# Patient Record
Sex: MALE | Race: WHITE | Employment: OTHER | URBAN - METROPOLITAN AREA
[De-identification: names, ages, dates, MRNs, and addresses within clinical notes are randomized per-mention and may not be internally consistent; named-entity substitution may affect disease eponyms.]

---

## 2017-04-19 PROBLEM — L97.921 NON-PRESSURE CHRONIC ULCER LEFT LOWER LEG, LIMITED TO BREAKDOWN SKIN (HCC): Chronic | Status: ACTIVE | Noted: 2017-04-19

## 2017-04-19 PROBLEM — L97.912 NON-PRESSURE ULCER OF RIGHT LOWER EXTREMITY WITH FAT LAYER EXPOSED (HCC): Chronic | Status: ACTIVE | Noted: 2017-04-19

## 2017-04-19 PROBLEM — L97.929 VENOUS STASIS ULCER OF LEFT LOWER EXTREMITY (HCC): Chronic | Status: ACTIVE | Noted: 2017-04-19

## 2017-04-19 PROBLEM — I83.019 VENOUS STASIS ULCER OF RIGHT LOWER EXTREMITY (HCC): Chronic | Status: ACTIVE | Noted: 2017-04-19

## 2017-04-19 PROBLEM — L97.919 VENOUS STASIS ULCER OF RIGHT LOWER EXTREMITY (HCC): Chronic | Status: ACTIVE | Noted: 2017-04-19

## 2017-04-19 PROBLEM — I83.029 VENOUS STASIS ULCER OF LEFT LOWER EXTREMITY (HCC): Chronic | Status: ACTIVE | Noted: 2017-04-19

## 2017-05-10 PROBLEM — T82.898A FEMORAL-POPLITEAL BYPASS GRAFT OCCLUSION, LEFT (HCC): Chronic | Status: ACTIVE | Noted: 2017-05-10

## 2017-05-10 PROBLEM — T82.898A: Chronic | Status: ACTIVE | Noted: 2017-05-10

## 2017-05-10 PROBLEM — I70.25 ATHEROSCLEROSIS OF NATIVE ARTERY OF LOWER EXTREMITY WITH ULCERATION OF CALF (HCC): Chronic | Status: ACTIVE | Noted: 2017-05-10

## 2018-07-26 ENCOUNTER — HOSPITAL ENCOUNTER (OUTPATIENT)
Dept: NON INVASIVE DIAGNOSTICS | Age: 78
Discharge: HOME | End: 2018-07-26
Payer: MEDICARE

## 2018-07-26 ENCOUNTER — HOSPITAL ENCOUNTER (OUTPATIENT)
Age: 78
Discharge: HOME | End: 2018-07-26
Payer: MEDICARE

## 2018-07-26 LAB
ALBUMIN SERPL-MCNC: 3.6 G/DL (ref 3.5–5.2)
ALP BLD-CCNC: 83 U/L (ref 40–129)
ALT SERPL-CCNC: 33 U/L (ref 0–40)
ANION GAP SERPL CALCULATED.3IONS-SCNC: 13 MMOL/L (ref 7–16)
AST SERPL-CCNC: 26 U/L (ref 0–39)
BILIRUB SERPL-MCNC: 1.2 MG/DL (ref 0–1.2)
BUN BLDV-MCNC: 13 MG/DL (ref 8–23)
CALCIUM SERPL-MCNC: 8.8 MG/DL (ref 8.6–10.2)
CHLORIDE BLD-SCNC: 104 MMOL/L (ref 98–107)
CO2: 24 MMOL/L (ref 22–29)
CREAT SERPL-MCNC: 0.8 MG/DL (ref 0.7–1.2)
EKG ATRIAL RATE: 80 BPM
EKG P AXIS: 78 DEGREES
EKG P-R INTERVAL: 178 MS
EKG Q-T INTERVAL: 460 MS
EKG QRS DURATION: 164 MS
EKG QTC CALCULATION (BAZETT): 530 MS
EKG R AXIS: -7 DEGREES
EKG T AXIS: 113 DEGREES
EKG VENTRICULAR RATE: 80 BPM
GFR AFRICAN AMERICAN: >60
GFR NON-AFRICAN AMERICAN: >60 ML/MIN/1.73
GLUCOSE BLD-MCNC: 153 MG/DL (ref 74–109)
MAGNESIUM: 1.7 MG/DL (ref 1.6–2.6)
POTASSIUM SERPL-SCNC: 4.3 MMOL/L (ref 3.5–5)
SODIUM BLD-SCNC: 141 MMOL/L (ref 132–146)
TOTAL PROTEIN: 7.1 G/DL (ref 6.4–8.3)

## 2018-07-26 PROCEDURE — 93005 ELECTROCARDIOGRAM TRACING: CPT | Performed by: INTERNAL MEDICINE

## 2018-07-26 PROCEDURE — 80053 COMPREHEN METABOLIC PANEL: CPT

## 2018-07-26 PROCEDURE — 93010 ELECTROCARDIOGRAM REPORT: CPT | Performed by: INTERNAL MEDICINE

## 2018-07-26 PROCEDURE — 36415 COLL VENOUS BLD VENIPUNCTURE: CPT

## 2018-07-26 PROCEDURE — 83735 ASSAY OF MAGNESIUM: CPT

## 2018-07-27 ENCOUNTER — HOSPITAL ENCOUNTER (INPATIENT)
Age: 78
LOS: 1 days | Discharge: ANOTHER HOSPITAL | DRG: 871 | End: 2018-07-28
Attending: EMERGENCY MEDICINE | Admitting: INTERNAL MEDICINE
Payer: MEDICARE

## 2018-07-27 ENCOUNTER — APPOINTMENT (OUTPATIENT)
Dept: GENERAL RADIOLOGY | Age: 78
DRG: 871 | End: 2018-07-27
Payer: MEDICARE

## 2018-07-27 ENCOUNTER — APPOINTMENT (OUTPATIENT)
Dept: CT IMAGING | Age: 78
DRG: 871 | End: 2018-07-27
Payer: MEDICARE

## 2018-07-27 DIAGNOSIS — I50.9 ACUTE ON CHRONIC CONGESTIVE HEART FAILURE, UNSPECIFIED CONGESTIVE HEART FAILURE TYPE: Primary | ICD-10-CM

## 2018-07-27 DIAGNOSIS — R65.10 SIRS (SYSTEMIC INFLAMMATORY RESPONSE SYNDROME) (HCC): ICD-10-CM

## 2018-07-27 DIAGNOSIS — K76.0 FATTY LIVER: ICD-10-CM

## 2018-07-27 PROBLEM — I50.20 SYSTOLIC HEART FAILURE (HCC): Status: ACTIVE | Noted: 2018-07-27

## 2018-07-27 PROBLEM — A41.9 SEPSIS (HCC): Status: ACTIVE | Noted: 2018-07-27

## 2018-07-27 PROBLEM — E11.9 LONG-TERM INSULIN USE IN TYPE 2 DIABETES (HCC): Status: ACTIVE | Noted: 2018-07-27

## 2018-07-27 PROBLEM — Z79.4 LONG-TERM INSULIN USE IN TYPE 2 DIABETES (HCC): Status: ACTIVE | Noted: 2018-07-27

## 2018-07-27 PROBLEM — Z95.810 BIVENTRICULAR ICD (IMPLANTABLE CARDIOVERTER-DEFIBRILLATOR) IN PLACE: Status: ACTIVE | Noted: 2018-07-27

## 2018-07-27 PROBLEM — E87.20 LACTIC ACIDOSIS: Status: ACTIVE | Noted: 2018-07-27

## 2018-07-27 PROBLEM — I50.43 ACUTE ON CHRONIC COMBINED SYSTOLIC AND DIASTOLIC CHF (CONGESTIVE HEART FAILURE) (HCC): Status: ACTIVE | Noted: 2018-07-27

## 2018-07-27 PROBLEM — J96.00 ACUTE RESPIRATORY FAILURE (HCC): Status: ACTIVE | Noted: 2018-07-27

## 2018-07-27 PROBLEM — I27.20 PULMONARY HYPERTENSION (HCC): Status: ACTIVE | Noted: 2018-07-27

## 2018-07-27 LAB
ACANTHOCYTES: ABNORMAL
ALBUMIN SERPL-MCNC: 3.8 G/DL (ref 3.5–5.2)
ALP BLD-CCNC: 93 U/L (ref 40–129)
ALT SERPL-CCNC: 34 U/L (ref 0–40)
ANION GAP SERPL CALCULATED.3IONS-SCNC: 23 MMOL/L (ref 7–16)
ANISOCYTOSIS: ABNORMAL
AST SERPL-CCNC: 30 U/L (ref 0–39)
B.E.: -10.5 MMOL/L (ref -3–3)
BASOPHILS ABSOLUTE: 0 E9/L (ref 0–0.2)
BASOPHILS RELATIVE PERCENT: 0.7 % (ref 0–2)
BILIRUB SERPL-MCNC: 1.9 MG/DL (ref 0–1.2)
BUN BLDV-MCNC: 15 MG/DL (ref 8–23)
BURR CELLS: ABNORMAL
CALCIUM SERPL-MCNC: 8.6 MG/DL (ref 8.6–10.2)
CHLORIDE BLD-SCNC: 102 MMOL/L (ref 98–107)
CO2: 16 MMOL/L (ref 22–29)
COHB: 1.6 % (ref 0–1.5)
CREAT SERPL-MCNC: 1.1 MG/DL (ref 0.7–1.2)
CRITICAL: ABNORMAL
DATE ANALYZED: ABNORMAL
DATE OF COLLECTION: ABNORMAL
EKG ATRIAL RATE: 86 BPM
EKG P AXIS: 59 DEGREES
EKG P-R INTERVAL: 168 MS
EKG Q-T INTERVAL: 422 MS
EKG QRS DURATION: 146 MS
EKG QTC CALCULATION (BAZETT): 504 MS
EKG R AXIS: -23 DEGREES
EKG T AXIS: 103 DEGREES
EKG VENTRICULAR RATE: 86 BPM
EOSINOPHILS ABSOLUTE: 0.14 E9/L (ref 0.05–0.5)
EOSINOPHILS RELATIVE PERCENT: 0.9 % (ref 0–6)
GFR AFRICAN AMERICAN: >60
GFR NON-AFRICAN AMERICAN: >60 ML/MIN/1.73
GLUCOSE BLD-MCNC: 259 MG/DL (ref 74–109)
HCO3: 14.4 MMOL/L (ref 22–26)
HCT VFR BLD CALC: 40.7 % (ref 37–54)
HEMOGLOBIN: 11.9 G/DL (ref 12.5–16.5)
HHB: 13.8 % (ref 0–5)
HYPOCHROMIA: ABNORMAL
INR BLD: 3.7
LAB: ABNORMAL
LACTIC ACID: 3.1 MMOL/L (ref 0.5–2.2)
LACTIC ACID: 8.8 MMOL/L (ref 0.5–2.2)
LYMPHOCYTES ABSOLUTE: 0.64 E9/L (ref 1.5–4)
LYMPHOCYTES RELATIVE PERCENT: 4.4 % (ref 20–42)
Lab: 640
MCH RBC QN AUTO: 20.4 PG (ref 26–35)
MCHC RBC AUTO-ENTMCNC: 29.2 % (ref 32–34.5)
MCV RBC AUTO: 69.7 FL (ref 80–99.9)
METER GLUCOSE: 191 MG/DL (ref 70–110)
METER GLUCOSE: 216 MG/DL (ref 70–110)
METER GLUCOSE: 294 MG/DL (ref 70–110)
METHB: 0.6 % (ref 0–1.5)
MODE: ABNORMAL
MONOCYTES ABSOLUTE: 0.48 E9/L (ref 0.1–0.95)
MONOCYTES RELATIVE PERCENT: 2.6 % (ref 2–12)
NEUTROPHILS ABSOLUTE: 14.72 E9/L (ref 1.8–7.3)
NEUTROPHILS RELATIVE PERCENT: 92.1 % (ref 43–80)
NUCLEATED RED BLOOD CELLS: 5.3 /100 WBC
O2 CONTENT: 14.9 ML/DL
O2 SATURATION: 85.9 % (ref 92–98.5)
O2HB: 84 % (ref 94–97)
OPERATOR ID: ABNORMAL
OVALOCYTES: ABNORMAL
PATIENT TEMP: 37 C
PCO2: 29.1 MMHG (ref 35–45)
PDW BLD-RTO: 19.6 FL (ref 11.5–15)
PEEP/CPAP: 5 CMH?O
PH BLOOD GAS: 7.31 (ref 7.35–7.45)
PIP: 16 CMH?O
PLATELET # BLD: 362 E9/L (ref 130–450)
PMV BLD AUTO: 9.5 FL (ref 7–12)
PO2: 59.8 MMHG (ref 60–100)
POIKILOCYTES: ABNORMAL
POLYCHROMASIA: ABNORMAL
POTASSIUM SERPL-SCNC: 5 MMOL/L (ref 3.5–5)
PRO-BNP: ABNORMAL PG/ML (ref 0–450)
PROCALCITONIN: 0.08 NG/ML (ref 0–0.08)
PROCALCITONIN: 2.46 NG/ML (ref 0–0.08)
PROTHROMBIN TIME: 41.4 SEC (ref 9.3–12.4)
RBC # BLD: 5.84 E12/L (ref 3.8–5.8)
SCHISTOCYTES: ABNORMAL
SODIUM BLD-SCNC: 141 MMOL/L (ref 132–146)
SOURCE, BLOOD GAS: ABNORMAL
TARGET CELLS: ABNORMAL
THB: 12.6 G/DL (ref 11.5–16.5)
TIME ANALYZED: 643
TOTAL PROTEIN: 7.5 G/DL (ref 6.4–8.3)
TROPONIN: 0.09 NG/ML (ref 0–0.03)
TROPONIN: 0.1 NG/ML (ref 0–0.03)
WBC # BLD: 16 E9/L (ref 4.5–11.5)

## 2018-07-27 PROCEDURE — 87186 SC STD MICRODIL/AGAR DIL: CPT

## 2018-07-27 PROCEDURE — 85025 COMPLETE CBC W/AUTO DIFF WBC: CPT

## 2018-07-27 PROCEDURE — 87581 M.PNEUMON DNA AMP PROBE: CPT

## 2018-07-27 PROCEDURE — 87040 BLOOD CULTURE FOR BACTERIA: CPT

## 2018-07-27 PROCEDURE — 99223 1ST HOSP IP/OBS HIGH 75: CPT | Performed by: INTERNAL MEDICINE

## 2018-07-27 PROCEDURE — 87502 INFLUENZA DNA AMP PROBE: CPT

## 2018-07-27 PROCEDURE — 87450 HC DIRECT STREP B ANTIGEN: CPT

## 2018-07-27 PROCEDURE — 94664 DEMO&/EVAL PT USE INHALER: CPT

## 2018-07-27 PROCEDURE — 85610 PROTHROMBIN TIME: CPT

## 2018-07-27 PROCEDURE — 36415 COLL VENOUS BLD VENIPUNCTURE: CPT

## 2018-07-27 PROCEDURE — 6370000000 HC RX 637 (ALT 250 FOR IP): Performed by: INTERNAL MEDICINE

## 2018-07-27 PROCEDURE — 6370000000 HC RX 637 (ALT 250 FOR IP): Performed by: EMERGENCY MEDICINE

## 2018-07-27 PROCEDURE — 2580000003 HC RX 258: Performed by: EMERGENCY MEDICINE

## 2018-07-27 PROCEDURE — 6370000000 HC RX 637 (ALT 250 FOR IP): Performed by: NURSE PRACTITIONER

## 2018-07-27 PROCEDURE — 2580000003 HC RX 258: Performed by: NURSE PRACTITIONER

## 2018-07-27 PROCEDURE — 94640 AIRWAY INHALATION TREATMENT: CPT

## 2018-07-27 PROCEDURE — 83605 ASSAY OF LACTIC ACID: CPT

## 2018-07-27 PROCEDURE — 87149 DNA/RNA DIRECT PROBE: CPT

## 2018-07-27 PROCEDURE — G8988 SELF CARE GOAL STATUS: HCPCS

## 2018-07-27 PROCEDURE — 2500000003 HC RX 250 WO HCPCS: Performed by: EMERGENCY MEDICINE

## 2018-07-27 PROCEDURE — 97166 OT EVAL MOD COMPLEX 45 MIN: CPT

## 2018-07-27 PROCEDURE — 87798 DETECT AGENT NOS DNA AMP: CPT

## 2018-07-27 PROCEDURE — 6360000002 HC RX W HCPCS: Performed by: EMERGENCY MEDICINE

## 2018-07-27 PROCEDURE — 87070 CULTURE OTHR SPECIMN AEROBIC: CPT

## 2018-07-27 PROCEDURE — 6360000002 HC RX W HCPCS: Performed by: NURSE PRACTITIONER

## 2018-07-27 PROCEDURE — 84145 PROCALCITONIN (PCT): CPT

## 2018-07-27 PROCEDURE — 2060000000 HC ICU INTERMEDIATE R&B

## 2018-07-27 PROCEDURE — 94660 CPAP INITIATION&MGMT: CPT

## 2018-07-27 PROCEDURE — 93005 ELECTROCARDIOGRAM TRACING: CPT | Performed by: EMERGENCY MEDICINE

## 2018-07-27 PROCEDURE — 83880 ASSAY OF NATRIURETIC PEPTIDE: CPT

## 2018-07-27 PROCEDURE — 71275 CT ANGIOGRAPHY CHEST: CPT

## 2018-07-27 PROCEDURE — G8987 SELF CARE CURRENT STATUS: HCPCS

## 2018-07-27 PROCEDURE — 71045 X-RAY EXAM CHEST 1 VIEW: CPT

## 2018-07-27 PROCEDURE — 2580000003 HC RX 258: Performed by: RADIOLOGY

## 2018-07-27 PROCEDURE — 97530 THERAPEUTIC ACTIVITIES: CPT

## 2018-07-27 PROCEDURE — 82805 BLOOD GASES W/O2 SATURATION: CPT

## 2018-07-27 PROCEDURE — 6360000004 HC RX CONTRAST MEDICATION: Performed by: RADIOLOGY

## 2018-07-27 PROCEDURE — 99285 EMERGENCY DEPT VISIT HI MDM: CPT

## 2018-07-27 PROCEDURE — 80053 COMPREHEN METABOLIC PANEL: CPT

## 2018-07-27 PROCEDURE — 87486 CHLMYD PNEUM DNA AMP PROBE: CPT

## 2018-07-27 PROCEDURE — 82962 GLUCOSE BLOOD TEST: CPT

## 2018-07-27 PROCEDURE — APPSS180 APP SPLIT SHARED TIME > 60 MINUTES: Performed by: NURSE PRACTITIONER

## 2018-07-27 PROCEDURE — 87503 INFLUENZA DNA AMP PROB ADDL: CPT

## 2018-07-27 PROCEDURE — 84484 ASSAY OF TROPONIN QUANT: CPT

## 2018-07-27 RX ORDER — SODIUM CHLORIDE 0.9 % (FLUSH) 0.9 %
10 SYRINGE (ML) INJECTION
Status: COMPLETED | OUTPATIENT
Start: 2018-07-27 | End: 2018-07-27

## 2018-07-27 RX ORDER — FUROSEMIDE 10 MG/ML
40 INJECTION INTRAMUSCULAR; INTRAVENOUS ONCE
Status: COMPLETED | OUTPATIENT
Start: 2018-07-27 | End: 2018-07-27

## 2018-07-27 RX ORDER — INSULIN GLARGINE 100 [IU]/ML
20 INJECTION, SOLUTION SUBCUTANEOUS EVERY MORNING
Status: DISCONTINUED | OUTPATIENT
Start: 2018-07-27 | End: 2018-07-29 | Stop reason: HOSPADM

## 2018-07-27 RX ORDER — SODIUM CHLORIDE 0.9 % (FLUSH) 0.9 %
10 SYRINGE (ML) INJECTION PRN
Status: DISCONTINUED | OUTPATIENT
Start: 2018-07-27 | End: 2018-07-29 | Stop reason: HOSPADM

## 2018-07-27 RX ORDER — ACETAMINOPHEN 325 MG/1
650 TABLET ORAL EVERY 4 HOURS PRN
Status: DISCONTINUED | OUTPATIENT
Start: 2018-07-27 | End: 2018-07-29 | Stop reason: HOSPADM

## 2018-07-27 RX ORDER — LOSARTAN POTASSIUM 50 MG/1
50 TABLET ORAL DAILY
Status: DISCONTINUED | OUTPATIENT
Start: 2018-07-27 | End: 2018-07-28

## 2018-07-27 RX ORDER — DEXTROSE MONOHYDRATE 50 MG/ML
100 INJECTION, SOLUTION INTRAVENOUS PRN
Status: DISCONTINUED | OUTPATIENT
Start: 2018-07-27 | End: 2018-07-27 | Stop reason: SDUPTHER

## 2018-07-27 RX ORDER — NICOTINE POLACRILEX 4 MG
15 LOZENGE BUCCAL PRN
Status: DISCONTINUED | OUTPATIENT
Start: 2018-07-27 | End: 2018-07-27 | Stop reason: SDUPTHER

## 2018-07-27 RX ORDER — FUROSEMIDE 10 MG/ML
40 INJECTION INTRAMUSCULAR; INTRAVENOUS 2 TIMES DAILY
Status: DISCONTINUED | OUTPATIENT
Start: 2018-07-27 | End: 2018-07-28

## 2018-07-27 RX ORDER — DEXTROSE MONOHYDRATE 25 G/50ML
12.5 INJECTION, SOLUTION INTRAVENOUS PRN
Status: DISCONTINUED | OUTPATIENT
Start: 2018-07-27 | End: 2018-07-27 | Stop reason: SDUPTHER

## 2018-07-27 RX ORDER — ATORVASTATIN CALCIUM 40 MG/1
40 TABLET, FILM COATED ORAL NIGHTLY
Status: DISCONTINUED | OUTPATIENT
Start: 2018-07-27 | End: 2018-07-29 | Stop reason: HOSPADM

## 2018-07-27 RX ORDER — ACETAMINOPHEN 650 MG/1
650 SUPPOSITORY RECTAL EVERY 4 HOURS PRN
Status: DISCONTINUED | OUTPATIENT
Start: 2018-07-27 | End: 2018-07-29 | Stop reason: HOSPADM

## 2018-07-27 RX ORDER — SODIUM CHLORIDE 0.9 % (FLUSH) 0.9 %
10 SYRINGE (ML) INJECTION EVERY 12 HOURS SCHEDULED
Status: DISCONTINUED | OUTPATIENT
Start: 2018-07-27 | End: 2018-07-29 | Stop reason: HOSPADM

## 2018-07-27 RX ORDER — ACETAMINOPHEN 500 MG
1000 TABLET ORAL ONCE
Status: DISCONTINUED | OUTPATIENT
Start: 2018-07-27 | End: 2018-07-29 | Stop reason: HOSPADM

## 2018-07-27 RX ORDER — LATANOPROST 50 UG/ML
1 SOLUTION/ DROPS OPHTHALMIC 2 TIMES DAILY
Status: DISCONTINUED | OUTPATIENT
Start: 2018-07-27 | End: 2018-07-27

## 2018-07-27 RX ORDER — LANOLIN ALCOHOL/MO/W.PET/CERES
400 CREAM (GRAM) TOPICAL EVERY OTHER DAY
Status: DISCONTINUED | OUTPATIENT
Start: 2018-07-27 | End: 2018-07-29 | Stop reason: HOSPADM

## 2018-07-27 RX ORDER — SPIRONOLACTONE 25 MG/1
12.5 TABLET ORAL DAILY
Status: DISCONTINUED | OUTPATIENT
Start: 2018-07-27 | End: 2018-07-29 | Stop reason: HOSPADM

## 2018-07-27 RX ORDER — DOXYCYCLINE HYCLATE 100 MG/1
100 CAPSULE ORAL EVERY 12 HOURS SCHEDULED
Status: DISCONTINUED | OUTPATIENT
Start: 2018-07-27 | End: 2018-07-28

## 2018-07-27 RX ORDER — LATANOPROST 50 UG/ML
1 SOLUTION/ DROPS OPHTHALMIC NIGHTLY
Status: DISCONTINUED | OUTPATIENT
Start: 2018-07-27 | End: 2018-07-29 | Stop reason: HOSPADM

## 2018-07-27 RX ORDER — TIMOLOL MALEATE 5 MG/ML
1 SOLUTION/ DROPS OPHTHALMIC DAILY
Status: DISCONTINUED | OUTPATIENT
Start: 2018-07-27 | End: 2018-07-29 | Stop reason: HOSPADM

## 2018-07-27 RX ORDER — NICOTINE POLACRILEX 4 MG
15 LOZENGE BUCCAL PRN
Status: DISCONTINUED | OUTPATIENT
Start: 2018-07-27 | End: 2018-07-29 | Stop reason: HOSPADM

## 2018-07-27 RX ORDER — DEXTROSE MONOHYDRATE 25 G/50ML
12.5 INJECTION, SOLUTION INTRAVENOUS PRN
Status: DISCONTINUED | OUTPATIENT
Start: 2018-07-27 | End: 2018-07-29 | Stop reason: HOSPADM

## 2018-07-27 RX ORDER — DEXTROSE MONOHYDRATE 50 MG/ML
100 INJECTION, SOLUTION INTRAVENOUS PRN
Status: DISCONTINUED | OUTPATIENT
Start: 2018-07-27 | End: 2018-07-29 | Stop reason: HOSPADM

## 2018-07-27 RX ORDER — IPRATROPIUM BROMIDE AND ALBUTEROL SULFATE 2.5; .5 MG/3ML; MG/3ML
3 SOLUTION RESPIRATORY (INHALATION) ONCE
Status: COMPLETED | OUTPATIENT
Start: 2018-07-27 | End: 2018-07-27

## 2018-07-27 RX ORDER — CARVEDILOL 3.12 MG/1
3.12 TABLET ORAL 2 TIMES DAILY
Status: DISCONTINUED | OUTPATIENT
Start: 2018-07-27 | End: 2018-07-29 | Stop reason: HOSPADM

## 2018-07-27 RX ORDER — DOFETILIDE 0.5 MG/1
500 CAPSULE ORAL 2 TIMES DAILY
Status: DISCONTINUED | OUTPATIENT
Start: 2018-07-27 | End: 2018-07-29 | Stop reason: HOSPADM

## 2018-07-27 RX ORDER — IPRATROPIUM BROMIDE AND ALBUTEROL SULFATE 2.5; .5 MG/3ML; MG/3ML
1 SOLUTION RESPIRATORY (INHALATION)
Status: DISCONTINUED | OUTPATIENT
Start: 2018-07-27 | End: 2018-07-29 | Stop reason: HOSPADM

## 2018-07-27 RX ADMIN — DOXYCYCLINE 100 MG: 100 INJECTION, POWDER, LYOPHILIZED, FOR SOLUTION INTRAVENOUS at 08:11

## 2018-07-27 RX ADMIN — FUROSEMIDE 40 MG: 10 INJECTION, SOLUTION INTRAMUSCULAR; INTRAVENOUS at 18:45

## 2018-07-27 RX ADMIN — DOFETILIDE 500 MCG: 0.5 CAPSULE ORAL at 12:53

## 2018-07-27 RX ADMIN — Medication 10 ML: at 08:38

## 2018-07-27 RX ADMIN — IPRATROPIUM BROMIDE AND ALBUTEROL SULFATE 1 AMPULE: 2.5; .5 SOLUTION RESPIRATORY (INHALATION) at 11:33

## 2018-07-27 RX ADMIN — CARVEDILOL 3.12 MG: 3.12 TABLET, FILM COATED ORAL at 20:42

## 2018-07-27 RX ADMIN — INSULIN LISPRO 2 UNITS: 100 INJECTION, SOLUTION INTRAVENOUS; SUBCUTANEOUS at 20:42

## 2018-07-27 RX ADMIN — CEFTRIAXONE 2 G: 2 INJECTION, POWDER, FOR SOLUTION INTRAMUSCULAR; INTRAVENOUS at 07:23

## 2018-07-27 RX ADMIN — Medication 10 ML: at 20:43

## 2018-07-27 RX ADMIN — IPRATROPIUM BROMIDE AND ALBUTEROL SULFATE 1 AMPULE: 2.5; .5 SOLUTION RESPIRATORY (INHALATION) at 17:38

## 2018-07-27 RX ADMIN — DOFETILIDE 500 MCG: 0.5 CAPSULE ORAL at 20:42

## 2018-07-27 RX ADMIN — IPRATROPIUM BROMIDE AND ALBUTEROL SULFATE 3 AMPULE: 2.5; .5 SOLUTION RESPIRATORY (INHALATION) at 06:36

## 2018-07-27 RX ADMIN — DOXYCYCLINE HYCLATE 100 MG: 100 CAPSULE, GELATIN COATED ORAL at 20:42

## 2018-07-27 RX ADMIN — Medication 10 ML: at 12:54

## 2018-07-27 RX ADMIN — INSULIN LISPRO 6 UNITS: 100 INJECTION, SOLUTION INTRAVENOUS; SUBCUTANEOUS at 17:12

## 2018-07-27 RX ADMIN — MAGNESIUM GLUCONATE 500 MG ORAL TABLET 400 MG: 500 TABLET ORAL at 12:53

## 2018-07-27 RX ADMIN — ATORVASTATIN CALCIUM 40 MG: 40 TABLET, FILM COATED ORAL at 20:42

## 2018-07-27 RX ADMIN — CARVEDILOL 3.12 MG: 3.12 TABLET, FILM COATED ORAL at 12:54

## 2018-07-27 RX ADMIN — FUROSEMIDE 40 MG: 10 INJECTION, SOLUTION INTRAMUSCULAR; INTRAVENOUS at 12:53

## 2018-07-27 RX ADMIN — LOSARTAN POTASSIUM 50 MG: 50 TABLET, FILM COATED ORAL at 12:53

## 2018-07-27 RX ADMIN — ACETAMINOPHEN 650 MG: 325 TABLET, FILM COATED ORAL at 12:54

## 2018-07-27 RX ADMIN — IOPAMIDOL 60 ML: 755 INJECTION, SOLUTION INTRAVENOUS at 08:38

## 2018-07-27 RX ADMIN — LATANOPROST 1 DROP: 50 SOLUTION/ DROPS OPHTHALMIC at 20:42

## 2018-07-27 RX ADMIN — SPIRONOLACTONE 12.5 MG: 25 TABLET, FILM COATED ORAL at 17:12

## 2018-07-27 ASSESSMENT — ENCOUNTER SYMPTOMS
SHORTNESS OF BREATH: 1
COLOR CHANGE: 1
EYE PAIN: 0
SORE THROAT: 0
VOMITING: 0
COUGH: 0
EYE DISCHARGE: 0
ABDOMINAL PAIN: 0
DIARRHEA: 0
NAUSEA: 0
EYE REDNESS: 0
ABDOMINAL DISTENTION: 1
WHEEZING: 0
BACK PAIN: 0
SINUS PRESSURE: 0

## 2018-07-27 ASSESSMENT — PAIN SCALES - GENERAL
PAINLEVEL_OUTOF10: 0

## 2018-07-27 NOTE — H&P
Hospital Medicine History & Physical      PCP: Deena Hansen MD    Date of Admission: 7/27/2018    Date of Service: Pt seen/examined on .07/27/18  and Admitted to Inpatient with expected LOS greater than two midnights due to medical therapy. Chief Complaint:  SOB     History Of Present Illness:  Records reviewed. This is a  77 y.o. male who presented to [address] [place] [address] with SOB. Past medical history as noted below which is significant for systolic heart failure, CAD, status post right fem-pop bypass graft, status post TAVR, diabetes,HTN and otherwise as noted below. Patient presented to the hospital via EMS after sudden onset of shortness of breath during the night. His wife states that he has been having trouble breathing and sleeping in a chair for 3-4 weeks but he has improved during the day and yesterday did feel fine. He continues to sleep in the chair and awoke around 4 AM with sudden shortness of breath. He denies any chest pain. He has noted increased swelling in his legs due to unable to elevate them while he sleeps, due to sleeping in a chair and has been taking extra Lasix at home. Upon arrival to the ER, blood pressure is 135/80, heart rate 99, temperature 101.1, respirations 30 and 88% on 5 L of oxygen. He was placed on BiPAP and oxygen saturation improved to 100. Labs showed lactic acid of 8.8, glucose 259, anion gap pf 23, BNP 10,235, troponin 0.09, WBCs 16.0. INR 3.7. Blood cultures were drawn. Chest x-ray showed mild CHF pattern. CTA showed negative PE, small bilateral pleural effusions and mild perihilar vascular congestion. S/P bypass surgery on his right leg at [facility] in April; goes to surgeon every two weeks for non-healing leg wound   Seen surgeon 2 weeks ago, improving site, but remains open with serous, yellow drainage     August 2017- s/p TAVR, on coumadin     Upon assessment in the ED, patient is lying on bed with Bipap on.  Denies any chest pain, INR 2-3, will hold for now    CAD  - slightly elevated trop; but baseline from [facility] is slightly elevated also, will trend   - Cardiac cath in April 2017 LAD: The 1st Diagonal is narrowed 90 %. This segment is described as Bifurcation,   without Collaterals and without an ISR. The Proximal is narrowed 50 %. ; mild diffuse disease noted with medical management recommended     Valvular disease status post TAVR   -  Done on 8/1/17 at the [facility]    Moderate pulmonary hypertension  - Noted in the past on echo    Diabetes, type II, on insulin  - Last hemoglobin A1c done in [facility] in March was 6.8    History of atrial fibrillation on Tikosyn  - History of cardioversion at the [facility], on tikosyn therapy  - On Coumadin for elevated chads score    PVD     COPD   - Ex smoker       PLAN:    Admit to telemetry  Consult cardiology  Consult infectious disease  Cultures pending  Cycle troponin   IV Lasix ×1 dose  Monitor strict I&O's, daily weights  Wean off BiPAP if able; continuous pulse ox  Continue home medications  Aerosol treatments  Sliding scale insulin  Hold Coumadin tonight due to elevated INR and pharmacy consult to manage Coumadin, goal INR 2-3    DVT Prophylaxis: coumadin  Diet: DIET CARDIAC; Carb Control: 4 carb choices (60 gms)/meal  Code Status: Full Code    PT/OT Eval Status: ordered     Dispo - [address], APRN - CNP    Thank you Julian Salvador MD for the opportunity to be involved in this patient's care.  If you have any questions or concerns please feel free to contact me at [phone]

## 2018-07-27 NOTE — PROGRESS NOTES
left ([facility] 5/10/2017    Long-term insulin use in type 2 diabetes ([facility] [address] 7/27/2018    Non-pressure chronic ulcer left lower leg, limited to breakdown skin ([facility] [address] 4/19/2017    Non-pressure ulcer of right lower extremity with fat layer exposed ([address] 4/19/2017    Occlusion of right femoropopliteal bypass graft ([facility] [address] 5/10/2017    Pulmonary hypertension 7/27/2018    Venous stasis ulcer of left lower extremity ([facility] [address] 4/19/2017    Venous stasis ulcer of right lower extremity ([facility] [address] 4/19/2017       Precautions: falls, contact isolation      Home Living: Lives with wife in 1-level house with 3-steps from living room to kitchen; bed/bath on same level as kitchen  Equipment owned: [employer]     Prior Level of Function: pt reports he receives Min A from his wife for ADLs; func mobility Mod I       Pain Level: pt reports some pain in B LEs at wound sites     Cognition: oriented x 3; follows 3 step directions. indep Problem solving skills  indep Memory   indep Sequencing  indepSafety Awareness/Judgement  Additional Comments: N/A     Sensory:   Hearing: WFL  Vision: WFL       UE Assessment:  Hand Dominance: Right [x]  Left []     Strength ROM             Additional Info:    RUE           4/5             WFL            WFL               N/A      LUE 4/5 WFL            WFL               N/A        Sensation: intact  Tone: normal  Edema:N/A   FMC: WFL  GMC: [facility]     Functional Assessment:   Initial Status: Comments:                               GOALS:   Feeding  indep N/A  N/A    Grooming  setup N/A  indep   Upper Body Dressing Michele   N/A   SBA    Lower Body Dressing Mod A  N/A  Michele     Bathing ModA  N/A  Michele     Toileting  Michele   N/A  SBA    Bed Mobility  indep N/A  N/A    Functional Transfers Michele   Sit to stand   SBA     Functional Mobility Michele   Side step to HOB  SBA     Pt/Family participated in establishment of the goals.     Sit balance: indep  Stand balance: Michele    Endurance/Activity tolerance: fair

## 2018-07-27 NOTE — ED PROVIDER NOTES
normal heart sounds. No murmur heard. Pulmonary/Chest: He is in respiratory distress. He has no wheezes. He has rales. He exhibits no tenderness. Increased work of breathing decreased lung sounds patient has poor air movement. Scattered rales present. Abdominal: Soft. Bowel sounds are normal. There is no tenderness. There is no rebound and no guarding. Musculoskeletal: He exhibits edema. He exhibits no tenderness or deformity. Neurological: He is alert and oriented to person, place, and time. No cranial nerve deficit. Coordination normal.   Skin: Skin is warm. He is diaphoretic. There is erythema. Nursing note and vitals reviewed. Procedures    MDM  Number of Diagnoses or Management Options  Diagnosis management comments: Patient evaluated for shortness of breath, edema of the lower extremities. We'll treat with BiPAP patient appears to be in fluid overload state. We will give DuoNeb's as patient's lung sounds are decreased May have component of COPD given patient's smoking history. Patient is febrile considering infectious possibilities such as pneumonia. Patient's fever treated with Tylenol patient has an allergy to NSAIDs. 6:55 patient reevaluated appears to be breathing significantly easier on BiPAP in answer questions with full sentences. Patient states he has had a weeklong illness where he is coughing up significant amount mucus. Patient shortness of breath got worse yesterday, and is currently febrile. Patients chest x-ray reviewed no masses or infiltrate appreciated. Significant vascular congestion present no recent chest x-ray to compare to. Blood gas shows hypoxia and no hypercapnia present    8:11 reviewed other results. symptoms meet SIRS criteria. No source of infection. Will ordered CTA to rule out PE.     9:15 no PE present.  Will admit patient as CHF exacerbation with SIRS    9:30 Admitted patient discussed care, patient not given lasix as they have sirs with possible sepsis wiill not provide bolus due to fluid overload state will use bipap to decrease preload to increase movement of fluid to intravascular space.     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 [address] Long-term insulin use in type 2 diabetes ([address] Non-pressure chronic ulcer left lower leg, limited to breakdown skin ([address] Non-pressure ulcer of right lower extremity with fat layer exposed ([address] Occlusion of right femoropopliteal bypass graft ([address] Pulmonary hypertension; Venous stasis ulcer of left lower extremity ([address] and Venous stasis ulcer of right lower extremity ([address] Past Surgical History:  has a past surgical history that includes eye surgery; Tonsillectomy; pacemaker placement (12/2014); and vascular surgery (Bilateral, 2014). Social History:  reports that he has quit smoking. He does not have any smokeless tobacco history on file. He reports that he does not drink alcohol or use drugs. Family History: family history is not on file. The patients home medications have been reviewed. Allergies: Aspirin; Levofloxacin; Plavix [clopidogrel bisulfate]; Clindamycin;  Codeine; Metronidazole; and Pcn [penicillins]    -------------------------------------------------- RESULTS -------------------------------------------------    LABS:  Results for orders placed or performed during the hospital encounter of 07/27/18   Troponin   Result Value Ref Range    Troponin 0.09 (H) 0.00 - 0.03 ng/mL   CBC Auto Differential   Result Value Ref Range    WBC 16.0 (H) 4.5 - 11.5 E9/L    RBC 5.84 (H) 3.80 - 5.80 E12/L    Hemoglobin 11.9 (L) 12.5 - 16.5 g/dL    Hematocrit 40.7 37.0 - 54.0 %    MCV 69.7 (L) 80.0 - 99.9 fL    MCH 20.4 (L) 26.0 - 35.0 pg    MCHC 29.2 (L) 32.0 - 34.5 %    RDW 19.6 (H) 11.5 - 15.0 fL    Platelets 610 899 - 690 E9/L    MPV 9.5 7.0 - 12.0 fL    Neutrophils % 92.1 (H) 43.0 - 80.0 %    Lymphocytes % 4.4 (L) 20.0 - 42.0 %    Monocytes % 2.6 2.0 - 12.0 %    Eosinophils % 0.9 0.0 - 6.0 %    Basophils % 0.7 0.0 - 2.0 %    Neutrophils # 14.72 (H) 1.80 - 7.30 E9/L    Lymphocytes # 0.64 (L) 1.50 - 4.00 E9/L    Monocytes # 0.48 0.10 - 0.95 E9/L    Eosinophils # 0.14 0.05 - 0.50 E9/L    Basophils # 0.00 0.00 - 0.20 E9/L    nRBC 5.3 /100 WBC    Anisocytosis 1+     Polychromasia 2+     Hypochromia 1+     Poikilocytes 3+     Schistocytes 1+     Acanthocytes 1+     [place] Cells 2+     Ovalocytes 1+     Target Cells 1+    Comprehensive Metabolic Panel   Result Value Ref Range    Sodium 141 132 - 146 mmol/L    Potassium 5.0 3.5 - 5.0 mmol/L    Chloride 102 98 - 107 mmol/L    CO2 16 (L) 22 - 29 mmol/L    Anion Gap 23 (H) 7 - 16 mmol/L    Glucose 259 (H) 74 - 109 mg/dL    BUN 15 8 - 23 mg/dL    CREATININE 1.1 0.7 - 1.2 mg/dL    GFR Non-African American >60 >=60 mL/min/1.73    GFR African American >60     Calcium 8.6 8.6 - 10.2 mg/dL    Total Protein 7.5 6.4 - 8.3 g/dL    Alb 3.8 3.5 - 5.2 g/dL    Total Bilirubin 1.9 (H) 0.0 - 1.2 mg/dL    Alkaline Phosphatase 93 40 - 129 U/L    ALT 34 0 - 40 U/L    AST 30 0 - 39 U/L   Brain Natriuretic Peptide   Result Value Ref Range    Pro-BNP 10,235 (H) 0 - 450 pg/mL   Lactic Acid, Plasma   Result Value Ref Range    Lactic Acid 8.8 (HH) 0.5 - 2.2 mmol/L   Protime-INR   Result Value Ref Range    Protime 41.4 (H) 9.3 - 12.4 sec    INR 3.7    Blood Gas, Arterial   Result Value Ref Range    Date Analyzed [id number]     Time Analyzed 0643     Source: Blood Arterial upper abdomen reveals a single gallstone. Mild diffuse hepatic low attenuation representing hepatic steatosis. Calcified granulomas are noted in the spleen. Mild diffuse osteopenia. Mild degenerative disc disease of the mid and lower thoracic spine. IMPRESSION: 1. No evidence to suggest pulmonary arterial emboli. 2. Mild cardiomegaly associated with small bilateral pleural effusions and mild perihilar vascular congestion. No pulmonary edema. 3. Status post prior heart valve replacement. Atherosclerotic coronary calcifications. Left chest wall cardiac pacer. 4. Mild consolidation and airspace opacities of the lung bases posteriorly could represent atelectasis and/or infiltrates. 5. Single gallstone. 6. Mild diffuse hepatic steatosis. 7. Mild reflux of the IV contrast into the IVC and suprahepatic veins could reflect right heart strain. EKG: This EKG is signed and interpreted by me. Rate: 86  Rhythm: atrial sensed ventricular paced rhythm  Interpretation: no acute changes  Comparison: stable as compared to patient's most recent EKG      ------------------------- NURSING NOTES AND VITALS REVIEWED ---------------------------  Date / Time Roomed:  7/27/2018  6:24 AM  ED Bed Assignment:  [id number]    The nursing notes within the ED encounter and vital signs as below have been reviewed.      Patient Vitals for the past 24 hrs:   BP Temp Temp src Pulse Resp SpO2 Height Weight   07/27/18 2315 (!) 101/57 99.9 °F (37.7 °C) Temporal 76 18 99 % - -   07/27/18 1739 - - - - - 97 % - -   07/27/18 1700 117/69 98 °F (36.7 °C) Temporal 70 16 97 % - -   07/27/18 1253 - - - - - 94 % - -   07/27/18 1100 118/74 100.4 °F (38 °C) Axillary 91 (!) 32 99 % 5' 9\" (1.753 m) 216 lb 12.8 oz (98.3 kg)   07/27/18 1053 111/74 99 °F (37.2 °C) Oral 87 22 - - -   07/27/18 0853 - - - - 25 - - -   07/27/18 0850 107/70 98.5 °F (36.9 °C) Axillary 86 22 100 % - -   07/27/18 0812 115/66 - - 81 22 100 % - -   07/27/18 0740 - - - - 30 - - -

## 2018-07-27 NOTE — CONSULTS
present illness      REVIEW OF SYSTEMS:      CONSTITUTIONAL:  Fever  HEENT: denies blurring of vision or double vision, denies hearing problem  RESPIRATORY: + cough, shortness of breath, sputum expectoration. CARDIOVASCULAR:  Denies palpitation  GASTROINTESTINAL:  Denies abdomen pain, diarrhea or constipation. GENITOURINARY:  Denies burning urination or frequency of urination  INTEGUMENT: denies wound , rash  HEMATOLOGIC/LYMPHATIC:  Denies lymph node swelling, gum bleeding or easy bruising. MUSCULOSKELETAL:  Leg swelling and non healing wound   NEUROLOGICAL:  Denies light headed, dizziness, loss of consciousness, weakness of lower extremities, bowel or bladder incontinence. PHYSICAL EXAM:      /74   Pulse 91   Temp 100.4 °F (38 °C) (Axillary)   Resp (!) 32   Ht 5' 9\" (1.753 m)   Wt 216 lb 12.8 oz (98.3 kg)   SpO2 94%   BMI 32.02 kg/m²       General Appearance:    Awake, alert , no acute distress. Head:    Normocephalic, atraumatic   Eyes:    + pallor, no icterus,   Ears:    No obvious deformity or drainage.    Nose:   No nasal drainage   Throat:   Mucosa moist, no oral thrush   Neck:   Supple, no lymphadenopathy   Back:     no CVA tenderness   Lungs:     Crackles at the bases     Heart:    Regular rate and rhythm, systolic murmur +   Abdomen:     Soft, non-tender, bowel sounds present    Extremities:   + edema, Right leg wound - clean, some drainage    Pulses:   Dorsalis pedis palpable    Skin:   Wound right leg      CBC with Differential:      Lab Results   Component Value Date    WBC 16.0 07/27/2018    RBC 5.84 07/27/2018    HGB 11.9 07/27/2018    HCT 40.7 07/27/2018     07/27/2018    MCV 69.7 07/27/2018    MCH 20.4 07/27/2018    MCHC 29.2 07/27/2018    RDW 19.6 07/27/2018    NRBC 5.3 07/27/2018    LYMPHOPCT 4.4 07/27/2018    MONOPCT 2.6 07/27/2018    BASOPCT 0.7 07/27/2018    MONOSABS 0.48 07/27/2018    LYMPHSABS 0.64 07/27/2018    EOSABS 0.14 07/27/2018    BASOSABS 0.00 07/27/2018

## 2018-07-28 ENCOUNTER — APPOINTMENT (OUTPATIENT)
Dept: GENERAL RADIOLOGY | Age: 78
DRG: 871 | End: 2018-07-28
Payer: MEDICARE

## 2018-07-28 VITALS
WEIGHT: 227.74 LBS | HEIGHT: 69 IN | HEART RATE: 71 BPM | RESPIRATION RATE: 24 BRPM | BODY MASS INDEX: 33.73 KG/M2 | DIASTOLIC BLOOD PRESSURE: 51 MMHG | SYSTOLIC BLOOD PRESSURE: 122 MMHG | OXYGEN SATURATION: 98 % | TEMPERATURE: 96.2 F

## 2018-07-28 LAB
ANION GAP SERPL CALCULATED.3IONS-SCNC: 17 MMOL/L (ref 7–16)
B.E.: -5.8 MMOL/L (ref -3–3)
B.E.: -7.1 MMOL/L (ref -3–3)
BUN BLDV-MCNC: 25 MG/DL (ref 8–23)
CALCIUM SERPL-MCNC: 8.5 MG/DL (ref 8.6–10.2)
CHLORIDE BLD-SCNC: 96 MMOL/L (ref 98–107)
CO2: 22 MMOL/L (ref 22–29)
COHB: 0.4 % (ref 0–1.5)
COHB: 0.5 % (ref 0–1.5)
CREAT SERPL-MCNC: 1.3 MG/DL (ref 0.7–1.2)
CRITICAL: ABNORMAL
CRITICAL: ABNORMAL
DATE ANALYZED: ABNORMAL
DATE ANALYZED: ABNORMAL
DATE OF COLLECTION: ABNORMAL
DATE OF COLLECTION: ABNORMAL
FILM ARRAY ADENOVIRUS: NORMAL
FILM ARRAY BORDETELLA PERTUSSIS: NORMAL
FILM ARRAY CHLAMYDOPHILIA PNEUMONIAE: NORMAL
FILM ARRAY CORONAVIRUS 229E: NORMAL
FILM ARRAY CORONAVIRUS HKU1: NORMAL
FILM ARRAY CORONAVIRUS NL63: NORMAL
FILM ARRAY CORONAVIRUS OC43: NORMAL
FILM ARRAY INFLUENZA A VIRUS 09H1: NORMAL
FILM ARRAY INFLUENZA A VIRUS H1: NORMAL
FILM ARRAY INFLUENZA A VIRUS H3: NORMAL
FILM ARRAY INFLUENZA A VIRUS: NORMAL
FILM ARRAY INFLUENZA B: NORMAL
FILM ARRAY METAPNEUMOVIRUS: NORMAL
FILM ARRAY MYCOPLASMA PNEUMONIAE: NORMAL
FILM ARRAY PARAINFLUENZA VIRUS 1: NORMAL
FILM ARRAY PARAINFLUENZA VIRUS 2: NORMAL
FILM ARRAY PARAINFLUENZA VIRUS 3: NORMAL
FILM ARRAY PARAINFLUENZA VIRUS 4: NORMAL
FILM ARRAY RESPIRATORY SYNCITIAL VIRUS: NORMAL
FILM ARRAY RHINOVIRUS/ENTEROVIRUS: NORMAL
GFR AFRICAN AMERICAN: >60
GFR NON-AFRICAN AMERICAN: 53 ML/MIN/1.73
GLUCOSE BLD-MCNC: 158 MG/DL (ref 74–109)
HCO3: 17.3 MMOL/L (ref 22–26)
HCO3: 17.3 MMOL/L (ref 22–26)
HCT VFR BLD CALC: 35.5 % (ref 37–54)
HEMOGLOBIN: 10.7 G/DL (ref 12.5–16.5)
HHB: 0.9 % (ref 0–5)
HHB: 23.7 % (ref 0–5)
INR BLD: 3.5
L. PNEUMOPHILA SEROGP 1 UR AG: NORMAL
LAB: 9558
LAB: 9558
LACTIC ACID: 3.5 MMOL/L (ref 0.5–2.2)
LV EF: 13 %
LVEF MODALITY: NORMAL
Lab: 1353
Lab: 1515
MAGNESIUM: 1.5 MG/DL (ref 1.6–2.6)
MCH RBC QN AUTO: 20.2 PG (ref 26–35)
MCHC RBC AUTO-ENTMCNC: 30.1 % (ref 32–34.5)
MCV RBC AUTO: 67 FL (ref 80–99.9)
METER GLUCOSE: 163 MG/DL (ref 70–110)
METER GLUCOSE: 275 MG/DL (ref 70–110)
METHB: 0.4 % (ref 0–1.5)
METHB: 0.6 % (ref 0–1.5)
MODE: ABNORMAL
O2 CONTENT: 11.8 ML/DL
O2 CONTENT: 14.6 ML/DL
O2 SATURATION: 76.1 % (ref 92–98.5)
O2 SATURATION: 99.1 % (ref 92–98.5)
O2HB: 75.3 % (ref 94–97)
O2HB: 98.2 % (ref 94–97)
OPERATOR ID: 1994
OPERATOR ID: 2860
PATIENT TEMP: 37 C
PATIENT TEMP: 37 C
PCO2: 26.5 MMHG (ref 35–45)
PCO2: 31 MMHG (ref 35–45)
PDW BLD-RTO: 18.5 FL (ref 11.5–15)
PH BLOOD GAS: 7.36 (ref 7.35–7.45)
PH BLOOD GAS: 7.43 (ref 7.35–7.45)
PLATELET # BLD: 227 E9/L (ref 130–450)
PMV BLD AUTO: 9.6 FL (ref 7–12)
PO2: 173.9 MMHG (ref 60–100)
PO2: 45.5 MMHG (ref 60–100)
POTASSIUM REFLEX MAGNESIUM: 4.8 MMOL/L (ref 3.5–5)
PROTHROMBIN TIME: 39.1 SEC (ref 9.3–12.4)
RBC # BLD: 5.3 E12/L (ref 3.8–5.8)
SODIUM BLD-SCNC: 135 MMOL/L (ref 132–146)
SOURCE, BLOOD GAS: ABNORMAL
SOURCE, BLOOD GAS: ABNORMAL
STREP PNEUMONIAE ANTIGEN, URINE: NORMAL
THB: 10.3 G/DL (ref 11.5–16.5)
THB: 11.1 G/DL (ref 11.5–16.5)
TIME ANALYZED: 1356
TIME ANALYZED: 1547
WBC # BLD: 21.9 E9/L (ref 4.5–11.5)

## 2018-07-28 PROCEDURE — 85610 PROTHROMBIN TIME: CPT

## 2018-07-28 PROCEDURE — 2580000003 HC RX 258: Performed by: INTERNAL MEDICINE

## 2018-07-28 PROCEDURE — 36620 INSERTION CATHETER ARTERY: CPT

## 2018-07-28 PROCEDURE — 02HV33Z INSERTION OF INFUSION DEVICE INTO SUPERIOR VENA CAVA, PERCUTANEOUS APPROACH: ICD-10-PCS | Performed by: INTERNAL MEDICINE

## 2018-07-28 PROCEDURE — 6370000000 HC RX 637 (ALT 250 FOR IP): Performed by: NURSE PRACTITIONER

## 2018-07-28 PROCEDURE — 36556 INSERT NON-TUNNEL CV CATH: CPT | Performed by: INTERNAL MEDICINE

## 2018-07-28 PROCEDURE — 83605 ASSAY OF LACTIC ACID: CPT

## 2018-07-28 PROCEDURE — 6360000002 HC RX W HCPCS: Performed by: NURSE PRACTITIONER

## 2018-07-28 PROCEDURE — 6370000000 HC RX 637 (ALT 250 FOR IP): Performed by: INTERNAL MEDICINE

## 2018-07-28 PROCEDURE — 94660 CPAP INITIATION&MGMT: CPT

## 2018-07-28 PROCEDURE — 6360000002 HC RX W HCPCS: Performed by: INTERNAL MEDICINE

## 2018-07-28 PROCEDURE — 2700000000 HC OXYGEN THERAPY PER DAY

## 2018-07-28 PROCEDURE — 51702 INSERT TEMP BLADDER CATH: CPT

## 2018-07-28 PROCEDURE — 36600 WITHDRAWAL OF ARTERIAL BLOOD: CPT

## 2018-07-28 PROCEDURE — 99223 1ST HOSP IP/OBS HIGH 75: CPT | Performed by: INTERNAL MEDICINE

## 2018-07-28 PROCEDURE — 85027 COMPLETE CBC AUTOMATED: CPT

## 2018-07-28 PROCEDURE — 83735 ASSAY OF MAGNESIUM: CPT

## 2018-07-28 PROCEDURE — 82805 BLOOD GASES W/O2 SATURATION: CPT

## 2018-07-28 PROCEDURE — 36556 INSERT NON-TUNNEL CV CATH: CPT

## 2018-07-28 PROCEDURE — 82962 GLUCOSE BLOOD TEST: CPT

## 2018-07-28 PROCEDURE — 94640 AIRWAY INHALATION TREATMENT: CPT

## 2018-07-28 PROCEDURE — 80048 BASIC METABOLIC PNL TOTAL CA: CPT

## 2018-07-28 PROCEDURE — 2580000003 HC RX 258: Performed by: NURSE PRACTITIONER

## 2018-07-28 PROCEDURE — 87040 BLOOD CULTURE FOR BACTERIA: CPT

## 2018-07-28 PROCEDURE — 6360000002 HC RX W HCPCS: Performed by: STUDENT IN AN ORGANIZED HEALTH CARE EDUCATION/TRAINING PROGRAM

## 2018-07-28 PROCEDURE — 71045 X-RAY EXAM CHEST 1 VIEW: CPT

## 2018-07-28 PROCEDURE — 36415 COLL VENOUS BLD VENIPUNCTURE: CPT

## 2018-07-28 PROCEDURE — 93306 TTE W/DOPPLER COMPLETE: CPT

## 2018-07-28 PROCEDURE — 36625 INSERTION CATHETER ARTERY: CPT | Performed by: INTERNAL MEDICINE

## 2018-07-28 RX ORDER — MAGNESIUM SULFATE IN WATER 40 MG/ML
2 INJECTION, SOLUTION INTRAVENOUS ONCE
Status: COMPLETED | OUTPATIENT
Start: 2018-07-28 | End: 2018-07-28

## 2018-07-28 RX ORDER — 0.9 % SODIUM CHLORIDE 0.9 %
500 INTRAVENOUS SOLUTION INTRAVENOUS ONCE
Status: DISCONTINUED | OUTPATIENT
Start: 2018-07-28 | End: 2018-07-28

## 2018-07-28 RX ORDER — 0.9 % SODIUM CHLORIDE 0.9 %
500 INTRAVENOUS SOLUTION INTRAVENOUS ONCE
Status: COMPLETED | OUTPATIENT
Start: 2018-07-28 | End: 2018-07-28

## 2018-07-28 RX ORDER — DOBUTAMINE HYDROCHLORIDE 400 MG/100ML
5 INJECTION INTRAVENOUS CONTINUOUS
Status: DISCONTINUED | OUTPATIENT
Start: 2018-07-28 | End: 2018-07-29 | Stop reason: HOSPADM

## 2018-07-28 RX ORDER — 0.9 % SODIUM CHLORIDE 0.9 %
1000 INTRAVENOUS SOLUTION INTRAVENOUS ONCE
Status: DISCONTINUED | OUTPATIENT
Start: 2018-07-28 | End: 2018-07-28

## 2018-07-28 RX ORDER — LIDOCAINE HYDROCHLORIDE 10 MG/ML
INJECTION, SOLUTION INFILTRATION; PERINEURAL
Status: DISCONTINUED
Start: 2018-07-28 | End: 2018-07-29 | Stop reason: HOSPADM

## 2018-07-28 RX ORDER — SODIUM CHLORIDE 9 MG/ML
500 INJECTION, SOLUTION INTRAVENOUS ONCE
Status: COMPLETED | OUTPATIENT
Start: 2018-07-28 | End: 2018-07-28

## 2018-07-28 RX ADMIN — VANCOMYCIN HYDROCHLORIDE 1.5 G: 10 INJECTION, POWDER, LYOPHILIZED, FOR SOLUTION INTRAVENOUS at 14:54

## 2018-07-28 RX ADMIN — SPIRONOLACTONE 12.5 MG: 25 TABLET, FILM COATED ORAL at 08:05

## 2018-07-28 RX ADMIN — TIMOLOL MALEATE 1 DROP: 5 SOLUTION OPHTHALMIC at 08:04

## 2018-07-28 RX ADMIN — INSULIN GLARGINE 20 UNITS: 100 INJECTION, SOLUTION SUBCUTANEOUS at 08:06

## 2018-07-28 RX ADMIN — DOBUTAMINE HYDROCHLORIDE 5 MCG/KG/MIN: 400 INJECTION INTRAVENOUS at 20:01

## 2018-07-28 RX ADMIN — LOSARTAN POTASSIUM 50 MG: 50 TABLET, FILM COATED ORAL at 08:05

## 2018-07-28 RX ADMIN — CARVEDILOL 3.12 MG: 3.12 TABLET, FILM COATED ORAL at 08:05

## 2018-07-28 RX ADMIN — MAGNESIUM SULFATE HEPTAHYDRATE 2 G: 40 INJECTION, SOLUTION INTRAVENOUS at 19:05

## 2018-07-28 RX ADMIN — Medication 10 ML: at 08:06

## 2018-07-28 RX ADMIN — FUROSEMIDE 40 MG: 10 INJECTION, SOLUTION INTRAMUSCULAR; INTRAVENOUS at 08:05

## 2018-07-28 RX ADMIN — INSULIN LISPRO 2 UNITS: 100 INJECTION, SOLUTION INTRAVENOUS; SUBCUTANEOUS at 06:26

## 2018-07-28 RX ADMIN — DOFETILIDE 500 MCG: 0.5 CAPSULE ORAL at 21:21

## 2018-07-28 RX ADMIN — SODIUM CHLORIDE 500 ML: 9 INJECTION, SOLUTION INTRAVENOUS at 13:09

## 2018-07-28 RX ADMIN — WATER 1 G: 1 INJECTION INTRAMUSCULAR; INTRAVENOUS; SUBCUTANEOUS at 06:26

## 2018-07-28 RX ADMIN — CEFEPIME HYDROCHLORIDE 2 G: 2 INJECTION, POWDER, FOR SOLUTION INTRAVENOUS at 18:59

## 2018-07-28 RX ADMIN — ATORVASTATIN CALCIUM 40 MG: 40 TABLET, FILM COATED ORAL at 21:20

## 2018-07-28 RX ADMIN — ACETAMINOPHEN 650 MG: 650 SUPPOSITORY RECTAL at 12:35

## 2018-07-28 RX ADMIN — SODIUM CHLORIDE 500 ML: 9 INJECTION, SOLUTION INTRAVENOUS at 14:48

## 2018-07-28 RX ADMIN — CARVEDILOL 3.12 MG: 3.12 TABLET, FILM COATED ORAL at 21:20

## 2018-07-28 RX ADMIN — DOFETILIDE 500 MCG: 0.5 CAPSULE ORAL at 08:05

## 2018-07-28 RX ADMIN — IPRATROPIUM BROMIDE AND ALBUTEROL SULFATE 1 AMPULE: 2.5; .5 SOLUTION RESPIRATORY (INHALATION) at 10:31

## 2018-07-28 RX ADMIN — DOXYCYCLINE HYCLATE 100 MG: 100 CAPSULE, GELATIN COATED ORAL at 08:05

## 2018-07-28 ASSESSMENT — PAIN SCALES - GENERAL
PAINLEVEL_OUTOF10: 0

## 2018-07-28 NOTE — PROGRESS NOTES
Hospitalist Progress Note      PCP: Erica Ortega MD    Date of Admission: 7/27/2018    Chief Complaint: fever    Hospital Course: Patient admitted with sepsis. Found to be bacteremic. Continues on IV antibiotics. ID consulted. Had worsening hypotension prompting transfer to MICU     Subjective: Patient more confused this am.        Medications:  Reviewed    Infusion Medications    dextrose       Scheduled Medications    vancomycin  1,500 mg Intravenous Q24H    sodium chloride  500 mL Intravenous Once    cefepime  2 g Intravenous Q12H    acetaminophen  1,000 mg Oral Once    carvedilol  3.125 mg Oral BID    dofetilide  500 mcg Oral BID    insulin glargine  20 Units Subcutaneous QAM    losartan  50 mg Oral Daily    magnesium oxide  400 mg Oral Every Other Day    timolol  1 drop Both Eyes Daily    sodium chloride flush  10 mL Intravenous 2 times per day    ipratropium-albuterol  1 ampule Inhalation Q4H WA    latanoprost  1 drop Both Eyes Nightly    insulin lispro  0-12 Units Subcutaneous TID WC    insulin lispro  0-6 Units Subcutaneous Nightly    warfarin (COUMADIN) daily dosing (placeholder)   Other RX Placeholder    spironolactone  12.5 mg Oral Daily    atorvastatin  40 mg Oral Nightly     PRN Meds: sodium chloride flush, sodium chloride flush, magnesium hydroxide, glucose, dextrose, glucagon (rDNA), dextrose, acetaminophen, acetaminophen, perflutren lipid microspheres      Intake/Output Summary (Last 24 hours) at 07/28/18 1636  Last data filed at 07/28/18 1430   Gross per 24 hour   Intake             1080 ml   Output                0 ml   Net             1080 ml       Exam:    BP (!) 72/0 Comment: doppler  Pulse 112   Temp 98.3 °F (36.8 °C) (Temporal)   Resp 26   Ht 5' 9\" (1.753 m)   Wt 219 lb 3.2 oz (99.4 kg)   SpO2 96%   BMI 32.37 kg/m²     General appearance: Mild distress, more confused today and sleepy   HEENT: Pupils equal, round, and reactive to light. hypertension [I27.20] 07/27/2018    Acute on chronic congestive heart failure ([facility] [address] [I50.9]    Septic shock    ID consulted  ECHO ordered  Wound care  Hold diuretics and antihypertensives  Consider EP consult given hx of ICD  Strict I/O  Bolus as clinically indicated  Transfer to MICU for pressors       DVT Prophylaxis: warfarin/INR  Diet: DIET CARDIAC; Low Sodium (2 GM);  Daily Fluid Restriction: 1800 ml  Code Status: Full Code    PT/OT Eval Status: ordered     Dispo - transfer to MICU     Jefferson Headley MD

## 2018-07-28 NOTE — PROGRESS NOTES
C/C: Resp failure , CHF, Pneumonia, Staph sepsis, bacteremia     The patient is awake and alert. \"don't feel good \"  Reports cough and shortness of breath   Febrile   Temp 101.2 F    Discussed with Dr Jonathan Hwang       Scheduled Meds:   vancomycin  1,500 mg Intravenous Q24H    sodium chloride  500 mL Intravenous Once    sodium chloride  500 mL Intravenous Once    acetaminophen  1,000 mg Oral Once    carvedilol  3.125 mg Oral BID    dofetilide  500 mcg Oral BID    insulin glargine  20 Units Subcutaneous QAM    losartan  50 mg Oral Daily    magnesium oxide  400 mg Oral Every Other Day    timolol  1 drop Both Eyes Daily    sodium chloride flush  10 mL Intravenous 2 times per day    ipratropium-albuterol  1 ampule Inhalation Q4H WA    latanoprost  1 drop Both Eyes Nightly    insulin lispro  0-12 Units Subcutaneous TID WC    insulin lispro  0-6 Units Subcutaneous Nightly    cefTRIAXone (ROCEPHIN) IV  1 g Intravenous Q24H    warfarin (COUMADIN) daily dosing (placeholder)   Other RX Placeholder    spironolactone  12.5 mg Oral Daily    atorvastatin  40 mg Oral Nightly     Continuous Infusions:   dextrose       PRN Meds:.sodium chloride flush, sodium chloride flush, magnesium hydroxide, glucose, dextrose, glucagon (rDNA), dextrose, acetaminophen, acetaminophen, perflutren lipid microspheres        REVIEW OF SYSTEMS:       CONSTITUTIONAL:  Fever  HEENT: denies blurring of vision or double vision, denies hearing problem  RESPIRATORY: + cough, shortness of breath, sputum expectoration. CARDIOVASCULAR:  Denies palpitation  GASTROINTESTINAL:  Denies abdomen pain, diarrhea or constipation. GENITOURINARY:  Denies burning urination or frequency of urination  INTEGUMENT: denies wound , rash  HEMATOLOGIC/LYMPHATIC:  Denies lymph node swelling, gum bleeding or easy bruising.   MUSCULOSKELETAL:  Leg swelling and non healing wound   NEUROLOGICAL:  Denies light headed, dizziness, loss of consciousness, weakness of lower Blood from Blood Updated: 07/28/18 1222    Blood Culture, Routine -- (A)    Gram stain performed from blood culture bottle media   Gram positive cocci in clusters   Staph aureus by PNA Fish        Wound Culture -   Specimen: Leg Updated: 07/28/18 1117    WOUND/ABSCESS --    Growth present, evaluating for:   Gram negative rods   Staph aureus   Corynebacteria    Narrative:          Radiology :     Chest X ray     CTA scan of chest -    IMPRESSION:  1. No evidence to suggest pulmonary arterial emboli. 2. Mild cardiomegaly associated with small bilateral pleural effusions  and mild perihilar vascular congestion. No pulmonary edema. 3. Status post prior heart valve replacement. Atherosclerotic coronary  calcifications. Left chest wall cardiac pacer. 4. Mild consolidation and airspace opacities of the lung bases  posteriorly could represent atelectasis and/or infiltrates. 5. Single gallstone. 6. Mild diffuse hepatic steatosis. 7. Mild reflux of the IV contrast into the IVC and suprahepatic veins  could reflect right heart strain.           IMPRESSION:      1. Fever, Leukocytosis ,sepsis   2. S aureus bacteremia , sepsis   3. CHF / Pneumonia   4. Right leg non healing wound - not infected        RECOMMENDATIONS:      1. Rocephin to cefepime 2 gram IV q 12 hrs  2. Vancomycin 1500 mg IV q 24  hr   3. Echo   4. Blood cx   5.  Local wound care       2:53 PM      7/28/2018

## 2018-07-28 NOTE — PROGRESS NOTES
Patient refusing chopra - explained the importance for fluid management and incontinence into the wounds.      Dr. Dione Huddleston notified of patient refusing chopra and BP 86/50

## 2018-07-29 NOTE — CONSULTS
[facility]   Department of Internal Medicine   Internal Medicine Residency  MICU H&P Note    Patient:  Chon Green 77 y.o. male   MRN: [id number]       Date of Service: 7/28/2018      Chief Complaint:  Septic Shock     History of Present Illness   The patient was a 76 y/o male with a past medical history of type II diabetes, COPD, hyperlipidemia,   HFrEF 10-15%, Hx of BIV ICD, Hx of PAF on Tikosyn, aortic stenosis s/p TAVR, PVD s/p left fem-pop bypass surgery transferred from floor due to septic shock. He was admitted for acute on chronic CHF with a pro-BNP of 10,235 on 07/27/18. He was found to have Staph bacteremia and hypotension on 07/28/18 and transferred to MICU for additional care. He was admitted to MICU for further management of septic shock.  During the course of his admission to MICU he was evaluated by by Cardiology service and planned for transfer to F     Past Medical History:      Diagnosis Date    Acute on chronic combined systolic and diastolic CHF (congestive heart failure) ([address] 7/27/2018    Arthritis     hips    Atherosclerosis of native artery of lower extremity with ulceration of calf ([address] 5/10/2017    Bilateral    Biventricular ICD (implantable cardioverter-defibrillator) in place     Biventricular ICD (implantable cardioverter-defibrillator) in place 7/27/2018    Blood circulation, collateral     CAD (coronary artery disease)     Femoral-popliteal bypass graft occlusion, left ([address] 5/10/2017    Long-term insulin use in type 2 diabetes ([address] 7/27/2018    Non-pressure chronic ulcer left lower leg, limited to breakdown skin ([address] 4/19/2017    Non-pressure ulcer of right lower extremity with fat layer exposed ([address] 4/19/2017    Occlusion of right femoropopliteal bypass graft ([address] 5/10/2017    Pulmonary hypertension 7/27/2018    Venous stasis ulcer of left lower extremity ([address] 4/19/2017    Venous stasis ulcer of right lower extremity ([address] 4/19/2017 tobacco history on file. ETOH:   reports that he does not drink alcohol. Family History:   No family history on file. REVIEW OF SYSTEMS:  Constitutional: No fever, no chill or change in weight; good appetite  HEENT: No blurred vision, no ear problems, no sore throat, no running nose. Respiratory: No cough, no sputum, no pleuritic chest pain, no shortness of breath  Cardiology: No angina, mostly bedbound and intermittently has dyspnea on rest.   Gastroenterology: No dysphagia, no reflux; no abdominal pain, no nausea or vomiting; no constipation or diarrhea. No blood in stool. Genitourinary: No dysuria, no frequency, hesitancy; no hematuria  Musculoskeletal: no joint pain, no myalgia, no change in range of movement  Neurology: no focal weakness in extremities, no slurred speech, no double vision, no tingling or numbness sensation. Endocrinology: no temperature intolerance, no polyphagia, polydipsia or polyuria  Hematology: no increased bleeding, no bruising, no lymphadenopathy  Skin: swelling in B/Llower extremities   Psychology: no depressed mood, no suicidal ideation    Physical Exam       Physical Exam:  · Vitals: BP (!) 90/56   Pulse 72   Temp 96.8 °F (36 °C) (Temporal)   Resp (!) 34   Ht 5' 9\" (1.753 m)   Wt 227 lb 11.8 oz (103.3 kg)   SpO2 100%   BMI 33.63 kg/m²     · I & O - 24hr: No intake/output data recorded. · General Appearance: mild to moderate confused  · HEENT:  Head: Normocephalic, no lesions, without obvious abnormality.   · Neck: no adenopathy, no carotid bruit, no JVD, supple, symmetrical, trachea midline and thyroid not enlarged, symmetric, no tenderness/mass/nodules  · Lung: diminished breath sounds bilaterally and posterior - bilateral  · Heart: regular rate and rhythm, S1, S2 normal, no murmur, click, rub or gallop  · Abdomen: soft, non-tender; bowel sounds normal; no masses,  no organomegaly  · Extremities:  +2 BLE edema with noted open sores on legs, open incision at RLE

## 2018-07-29 NOTE — FLOWSHEET NOTE
[facility] flight crew here and getting ready for transport. Placing patient  on their monitor equipment and IV poles.

## 2018-07-29 NOTE — PROCEDURES
Arterial line placement    Procedure: Right radial arterial line placement. Indications: Continuous monitoring of blood pressure  Anesthesia: Local infiltration of 1% lidocaine. Consent:  The patient provided verbal consent for this procedure. Technique: Time Out: Immediately prior to the procedure a \"timeout\" was called to verify the correct patient and procedure. Procedure was done using strict aseptic technique. Favio's test was performed and was normal. right radial site was cleaned with chloraprep and draped. Radial artery was identified, then Lidocaine 1% was infiltrated locally. Radial arterial line was inserted, a good blood flow was obtained, after which guidewire was inserted all the way with no resistance. Then the canula was inserted and needle with guidewire was withdrawn. Pulsatile bright red blood flow was observed. The canula was connected to BP monitoring apparatus and a good quality waveform was noted. Then the canula was secured with 2 stay sutures of 3-0 silk after Lidocaine infiltration, following which dressing was applied. Number of sticks: 2. Number of Kits used: 2    Complications: No immediate complication. Estimated blood loss: About 1 ml. Comment: Patient tolerated the procedure well.      Tin Powers M.D., PGY-1  Supervised by Elisabeth Kennedy M.D., PGY-3  Attending physician: Dr. Maria Elena Nolasco was present   [facility]

## 2018-07-30 LAB
BLOOD CULTURE, ROUTINE: ABNORMAL
BLOOD CULTURE, ROUTINE: ABNORMAL
CULTURE, BLOOD 2: ABNORMAL
CULTURE, BLOOD 2: ABNORMAL
ORGANISM: ABNORMAL
WOUND/ABSCESS: ABNORMAL
WOUND/ABSCESS: ABNORMAL

## 2018-08-02 LAB
BLOOD CULTURE, ROUTINE: ABNORMAL
BLOOD CULTURE, ROUTINE: ABNORMAL
CULTURE, BLOOD 2: ABNORMAL
CULTURE, BLOOD 2: ABNORMAL
ORGANISM: ABNORMAL
ORGANISM: ABNORMAL

## 2022-10-18 NOTE — CONSULTS
troponin 0.021)  WBC 16, hemoglobin 11.9, hematocrit 40.7, platelets 847, INR 3.7, pH 7.3, PCO2 29, PO2 59. Chest CT shows no evidence of pulmonary arterial emboli, mild cardiomegaly associated with bilateral pleural effusions and mild peripheral vascular congestion no pulmonary edema, status post prior heart valve replacement, atherosclerotic coronary calcifications, left chest wall pacer, mild consolidation airspace patient disease of lung bases posterior could represent atelectasis and/or infiltrates, single gallstone, mild diffuse hepatic steatosis, mild reflex of IV contrast into the IVC and suprahepatic veins could represent right heart strain. Chest x-ray shows mild CHF pattern. ER interventions include IV antibiotics Rocephin and doxycycline. Cardiology consult for heart failure with reduced ejection fraction        Please note: past medical records were reviewed per electronic medical record (EMR) - see detailed reports under Past Medical/ Surgical History. Past Medical History:    1. Coronary artery disease   - [facility] 4/3/2017 [facility] LMT: The LMT is Normal. LAD: The 1st Diagonal is narrowed 90 %. This segment is described as Bifurcation, without Collaterals and without an ISR. The Proximal is narrowed 50 %. This segment is described as Bifurcation. LCX: The This segment is described as Diffuse Disease, without Collaterals and  without an ISR. RCA: The This segment is described as Diffuse Disease, without Collaterals and  with an Unknown ISR   2. HFrEF    -[facility] 8/4/2017  Exam indication: Initial postoperative evaluation of prosthetic valve (baseline) The left ventricle is severely dilated. Left ventricular systolic function is  severely decreased. EF = 10 ± 5% (2D biplane) Definity contrast used for  endocardial border detection. Left ventricular diastolic function was not   evaluated due to pacing. The left atrial cavity is severely dilated. S/P transcatheter aortic valve replacement.  Finley-Mihir prosthetic pain, palpitations, and positive for feelings of heart racing after having a bowel movement. · Respiratory: Positive for increasing orthopnea and PND, positive for cough with clear sputum production  · Gastrointestinal: Denies heartburn, nausea/vomiting, diarrhea and constipation, black/bloody, and tarry stools. Positive for anorexia  · Genitourinary: Denies dysuria and hematuria  · Hematologic: Denies excessive bruising or bleeding  · Lymphatic: Denies lumps and bumps to neck, axilla, breast, and groin  · Endocrine: Denies excessive thirst. Denies intolerance to hot and cold  · Psychiatric: Denies anxiety and depression. PHYSICAL EXAM:   /74   Pulse 91   Temp 100.4 °F (38 °C) (Axillary)   Resp (!) 32   Ht 5' 9\" (1.753 m)   Wt 216 lb 12.8 oz (98.3 kg)   SpO2 99%   BMI 32.02 kg/m²   CONST:  Well developed,  male, examined in his room with wife present, sitting on the side of the bed well nourished who appears stated age. Awake, alert, cooperative, no apparent distress  HEENT:   Head- Normocephalic, atraumatic   Eyes- Conjunctivae pink, anicteric  Throat- Oral mucosa pink and moist  Neck-  No stridor, trachea midline, positive for jugular venous distention. No adenopathy   CHEST: Chest symmetrical and non-tender to palpation. No accessory muscle use or intercostal retractions  RESPIRATORY: Lung sounds - Rales throughout fields   CARDIOVASCULAR:     No carotid bruit  Heart Inspection- shows no noted pulsations  Heart Palpation- no heaves or thrills; PMI is non-displaced   Heart Ausculation- Regular rate and rhythm, 2/6 systol murmur. No s3, s4 or rub   PV: +3 bilateral lower extremity edema. No varicosities. Pedal pulses palpable, no clubbing or cyanosis cool to palpitation  ABDOMEN: Soft, rotund non-tender to light palpation. Bowel sounds present. No palpable masses no organomegaly; no abdominal bruit  MS: Good muscle strength and tone. No atrophy or abnormal movements.    : Deferred  SKIN: Warm and dry no statis dermatitis or ulcers   NEURO / PSYCH: Oriented to person, place and time. Speech clear and appropriate. Follows all commands. Pleasant affect     DATA:    EC2018 a sense V paced  Tele strips:  paced  Diagnostic:  CTA PULMONARY W CONTRAST   Final Result    IMPRESSION:   1. No evidence to suggest pulmonary arterial emboli. 2. Mild cardiomegaly associated with small bilateral pleural effusions   and mild perihilar vascular congestion. No pulmonary edema. 3. Status post prior heart valve replacement. Atherosclerotic coronary   calcifications. Left chest wall cardiac pacer. 4. Mild consolidation and airspace opacities of the lung bases   posteriorly could represent atelectasis and/or infiltrates. 5. Single gallstone. 6. Mild diffuse hepatic steatosis. 7. Mild reflux of the IV contrast into the IVC and suprahepatic veins   could reflect right heart strain. XR CHEST PORTABLE   Final Result   Mild CHF pattern. No intake or output data in the 24 hours ending 18 1156    Labs:   CBC:   Recent Labs      18   0645   WBC  16.0*   HGB  11.9*   HCT  40.7   PLT  362     BMP:   Recent Labs      18   1413  18   0645   NA  141  141   K  4.3  5.0   CO2  24  16*   BUN  13  15   CREATININE  0.8  1.1   LABGLOM  >60  >60   CALCIUM  8.8  8.6     Mag:   Recent Labs      18   1413   MG  1.7     ProBNP 10,235  PT/INR:   Recent Labs      18   0645   PROTIME  41.4*   INR  3.7     APTT:No results for input(s): APTT in the last 72 hours.   CARDIAC ENZYMES:  Recent Labs      18   0645   TROPONINI  0.09*     FASTING LIPID PANEL:No results found for: CHOL, HDL, LDLDIRECT, LDLCALC, TRIG  LIVER PROFILE:  Recent Labs      18   1413  18   0645   AST  26  30   ALT  33  34   LABALBU  3.6  3.8     Impression and plan per Dr. Reyes Garcia  Electronically signed by RAY Brown CNP on 2018 at 11:56 AM 18-Oct-2022 07:17
